# Patient Record
Sex: MALE | Race: WHITE | URBAN - METROPOLITAN AREA
[De-identification: names, ages, dates, MRNs, and addresses within clinical notes are randomized per-mention and may not be internally consistent; named-entity substitution may affect disease eponyms.]

---

## 2022-11-04 ENCOUNTER — OFFICE VISIT (OUTPATIENT)
Dept: CARDIOLOGY CLINIC | Age: 52
End: 2022-11-04
Payer: COMMERCIAL

## 2022-11-04 VITALS
DIASTOLIC BLOOD PRESSURE: 80 MMHG | RESPIRATION RATE: 16 BRPM | HEART RATE: 76 BPM | HEIGHT: 72 IN | WEIGHT: 315 LBS | BODY MASS INDEX: 42.66 KG/M2 | OXYGEN SATURATION: 95 % | SYSTOLIC BLOOD PRESSURE: 122 MMHG

## 2022-11-04 DIAGNOSIS — Z76.89 ESTABLISHING CARE WITH NEW DOCTOR, ENCOUNTER FOR: Primary | ICD-10-CM

## 2022-11-04 DIAGNOSIS — I10 HYPERTENSION, UNSPECIFIED TYPE: ICD-10-CM

## 2022-11-04 PROCEDURE — 93000 ELECTROCARDIOGRAM COMPLETE: CPT | Performed by: INTERNAL MEDICINE

## 2022-11-04 PROCEDURE — 3074F SYST BP LT 130 MM HG: CPT | Performed by: INTERNAL MEDICINE

## 2022-11-04 PROCEDURE — 99204 OFFICE O/P NEW MOD 45 MIN: CPT | Performed by: INTERNAL MEDICINE

## 2022-11-04 PROCEDURE — 3079F DIAST BP 80-89 MM HG: CPT | Performed by: INTERNAL MEDICINE

## 2022-11-04 RX ORDER — VALSARTAN AND HYDROCHLOROTHIAZIDE 320; 12.5 MG/1; MG/1
1 TABLET, FILM COATED ORAL DAILY
COMMUNITY
Start: 2022-09-06

## 2022-11-04 RX ORDER — AMLODIPINE BESYLATE 5 MG/1
5 TABLET ORAL DAILY
COMMUNITY

## 2022-11-04 RX ORDER — ESCITALOPRAM OXALATE 20 MG/1
TABLET ORAL
COMMUNITY
Start: 2021-11-29

## 2022-11-04 NOTE — PROGRESS NOTES
Yenny Soria MD, MS, [facility] [place]            HISTORY OF PRESENT ILLNESS:    Alanna Langley is a 50 y.o. male here to establish care. Followed by my prior practice. PMH HTN. Stress echo led to cardiac catherization that showed mild 30% stenosis. Records requested from prior practice - have not yet received. BP well controlled today. Walks at work,  @ [id number]. No CP, SOB. No edema. EKG reviewed, SR.       ++ DENA - wears. SUMMARY:   Problem List  Date Reviewed: 11/4/2022            Codes Class Noted    Establishing care with new doctor, encounter for ICD-10-CM: Z76.89  ICD-9-CM: V65.8  11/4/2022        Hypertension ICD-10-CM: I10  ICD-9-CM: 401.9  11/4/2022           Current Outpatient Medications on File Prior to Visit   Medication Sig    valsartan-hydroCHLOROthiazide (DIOVAN-HCT) 320-12.5 mg per tablet Take 1 Tablet by mouth daily. escitalopram oxalate (LEXAPRO) 20 mg tablet     amLODIPine (NORVASC) 5 mg tablet Take 5 mg by mouth daily. No current facility-administered medications on file prior to visit. CARDIOLOGY STUDIES TO DATE:  No results found for this visit on 11/04/22. CARDIAC ROS:   negative    History reviewed. No pertinent past medical history. History reviewed. No pertinent family history.     Social History     Socioeconomic History    Marital status: OTHER     Spouse name: Not on file    Number of children: Not on file    Years of education: Not on file    Highest education level: Not on file   Occupational History    Not on file   Tobacco Use    Smoking status: Former     Types: Cigarettes    Smokeless tobacco: Never   Substance and Sexual Activity    Alcohol use: Not on file    Drug use: Not on file    Sexual activity: Not on file   Other Topics Concern    Not on file   Social History Narrative    Not on file     Social Determinants of Health     Financial Resource Strain: Not on file   Food Insecurity: Not on file Transportation Needs: Not on file   Physical Activity: Not on file   Stress: Not on file   Social Connections: Not on file   Intimate Partner Violence: Not on file   Housing Stability: Not on file        GENERAL ROS:  A comprehensive review of systems was negative except for that written in the HPI. Visit Vitals  /80   Pulse 76   Resp 16   Ht 6' (1.829 m)   Wt 156.5 kg (345 lb)   SpO2 95%   BMI 46.79 kg/m²     Vitals:    11/04/22 1401   BP: 122/80   Pulse: 76   Resp: 16   SpO2: 95%   Weight: 156.5 kg (345 lb)   Height: 6' (1.829 m)        Wt Readings from Last 3 Encounters:   11/04/22 156.5 kg (345 lb)            BP Readings from Last 3 Encounters:   11/04/22 122/80       PHYSICAL EXAM  General appearance: alert, cooperative, no distress, appears stated age  Neck: supple, symmetrical, trachea midline, no adenopathy, thyroid: not enlarged, symmetric, no tenderness/mass/nodules, no carotid bruit, and no JVD  Lungs: clear to auscultation bilaterally  Heart: regular rate and rhythm, S1, S2 normal, no murmur, click, rub or gallop  Extremities: extremities normal, atraumatic, no cyanosis or edema    No results found for: CHOL, CHOLX, CHLST, CHOLV, 178043, HDL, HDLP, LDL, LDLC, DLDLP, TGLX, TRIGL, TRIGP, CHHD, CHHDX    No results found for: WBC, WBCLT, HGBPOC, HGB, HGBP, HCTPOC, HCT, PHCT, RBCH, PLT, MCV, HGBEXT, HCTEXT, PLTEXT, HGBEXT, HCTEXT, PLTEXT     No results found for: CHOL, CHOLPOCT, CHOLX, CHLST, CHOLV, HDL, HDLP, LDL, LDLC, DLDLP, TGLX, TRIGL, TRIGP, TGLPOCT, NTGLT, CHHD, CHHDX     ASSESSMENT    ICD-10-CM ICD-9-CM    1. Establishing care with new doctor, encounter for  Z76.89 V65.8 AMB POC EKG ROUTINE W/ 12 LEADS, INTER & REP      2. Hypertension, unspecified type  I10 401.9           Encounter Diagnoses   Name Primary?     Establishing care with new doctor, encounter for Yes    Hypertension, unspecified type      Orders Placed This Encounter    AMB POC EKG ROUTINE W/ 12 LEADS, INTER & REP valsartan-hydroCHLOROthiazide (DIOVAN-HCT) 320-12.5 mg per tablet    escitalopram oxalate (LEXAPRO) 20 mg tablet    amLODIPine (NORVASC) 5 mg tablet       Plan      Follow-up and Dispositions    Return in about 1 year (around 11/4/2023). Lizeth Blancas MD  11/4/2022        [address]  [address]  [phone] (F)    [address] [place] [address] [place]  [phone] (P)  [phone] (F)    ATTENTION:   This medical record was transcribed using an electronic medical records/speech recognition system. Although proofread, it may and can contain electronic, spelling and other errors. Corrections may be executed at a later time. Please feel free to contact us for any clarifications as needed.

## 2024-01-05 ENCOUNTER — OFFICE VISIT (OUTPATIENT)
Age: 54
End: 2024-01-05

## 2024-01-05 VITALS
RESPIRATION RATE: 16 BRPM | DIASTOLIC BLOOD PRESSURE: 80 MMHG | OXYGEN SATURATION: 95 % | HEART RATE: 80 BPM | HEIGHT: 72 IN | WEIGHT: 315 LBS | BODY MASS INDEX: 42.66 KG/M2 | SYSTOLIC BLOOD PRESSURE: 120 MMHG

## 2024-01-05 DIAGNOSIS — G47.33 OSA (OBSTRUCTIVE SLEEP APNEA): ICD-10-CM

## 2024-01-05 DIAGNOSIS — I10 HYPERTENSION, UNSPECIFIED TYPE: Primary | ICD-10-CM

## 2024-01-05 DIAGNOSIS — E66.01 CLASS 3 SEVERE OBESITY IN ADULT, UNSPECIFIED BMI, UNSPECIFIED OBESITY TYPE, UNSPECIFIED WHETHER SERIOUS COMORBIDITY PRESENT (HCC): ICD-10-CM

## 2024-01-05 PROBLEM — E66.813 CLASS 3 SEVERE OBESITY IN ADULT: Status: ACTIVE | Noted: 2024-01-05

## 2024-01-05 RX ORDER — BUPROPION HYDROCHLORIDE 150 MG/1
TABLET ORAL
COMMUNITY
Start: 2023-10-09

## 2024-01-05 ASSESSMENT — PATIENT HEALTH QUESTIONNAIRE - PHQ9
1. LITTLE INTEREST OR PLEASURE IN DOING THINGS: 0
2. FEELING DOWN, DEPRESSED OR HOPELESS: 0
SUM OF ALL RESPONSES TO PHQ QUESTIONS 1-9: 0
SUM OF ALL RESPONSES TO PHQ QUESTIONS 1-9: 0
SUM OF ALL RESPONSES TO PHQ9 QUESTIONS 1 & 2: 0
SUM OF ALL RESPONSES TO PHQ QUESTIONS 1-9: 0
SUM OF ALL RESPONSES TO PHQ QUESTIONS 1-9: 0

## 2024-01-05 NOTE — PROGRESS NOTES
Patient: Clyed Garcia  : 1970    Primary Cardiologist: Lexi Streeter MD    EP Cardiologist:  PCP: Herrera Jackson MD    Today's Date: 2024      ASSESSMENT AND PLAN:     Assessment and Plan:  HTN  Olmesartan HCT  Amlodipine  Well controlled    2. NENA  On CPAP    3. Obesity  Lifestyle changes, weight loss      Follow up one year.        ICD-10-CM    1. Hypertension, unspecified type  I10 EKG 12 Lead     EKG 12 Lead      2. NENA (obstructive sleep apnea)  G47.33       3. Class 3 severe obesity in adult, unspecified BMI, unspecified obesity type, unspecified whether serious comorbidity present ([facility])  E66.01           HISTORY OF PRESENT ILLNESS:     History of Present Illness:  Clyde Garcia is a 53 y.o. male    Clyde Garcia is a 52 y.o. male here to establish care.      Followed by my prior practice.      PMH HTN.      Stress echo led to cardiac catherization that showed mild 30% stenosis.  Records requested from prior practice - have not yet received.      BP well controlled today.      Walks at work,  @ [email or web address].  No CP, SOB.      No edema.        EKG reviewed, SR.         ++ NENA - wears.        Denies chest pain, edema, syncope, shortness of breath at rest, dyspnea on exertion, PND or orthopnea.  Has no tachycardia, palpitations or sense of arrhythmia.     PAST MEDICAL HISTORY:     History reviewed. No pertinent past medical history.    History reviewed. No pertinent surgical history.    CURRENT MEDICATIONS:    .  Current Outpatient Medications   Medication Sig Dispense Refill    buPROPion (WELLBUTRIN XL) 150 MG extended release tablet TAKE 1 TABLET BY MOUTH EVERY DAY IN THE MORNING FOR 90 DAYS      amLODIPine (NORVASC) 5 MG tablet Take 1 tablet by mouth daily      escitalopram (LEXAPRO) 20 MG tablet Take 1 tablet by mouth daily ceived the following from [facility]: Outside name: escitalopram oxalate (LEXAPRO) 20 mg tablet

## 2025-01-09 ENCOUNTER — OFFICE VISIT (OUTPATIENT)
Age: 55
End: 2025-01-09
Payer: COMMERCIAL

## 2025-01-09 VITALS
BODY MASS INDEX: 42.66 KG/M2 | WEIGHT: 315 LBS | HEIGHT: 72 IN | HEART RATE: 80 BPM | DIASTOLIC BLOOD PRESSURE: 80 MMHG | OXYGEN SATURATION: 99 % | SYSTOLIC BLOOD PRESSURE: 124 MMHG

## 2025-01-09 DIAGNOSIS — I10 HYPERTENSION, UNSPECIFIED TYPE: Primary | ICD-10-CM

## 2025-01-09 PROCEDURE — 3074F SYST BP LT 130 MM HG: CPT | Performed by: INTERNAL MEDICINE

## 2025-01-09 PROCEDURE — 93000 ELECTROCARDIOGRAM COMPLETE: CPT | Performed by: INTERNAL MEDICINE

## 2025-01-09 PROCEDURE — 99214 OFFICE O/P EST MOD 30 MIN: CPT | Performed by: INTERNAL MEDICINE

## 2025-01-09 PROCEDURE — 3079F DIAST BP 80-89 MM HG: CPT | Performed by: INTERNAL MEDICINE

## 2025-01-09 RX ORDER — FLUOXETINE HYDROCHLORIDE 60 MG/1
1 TABLET, FILM COATED ORAL; ORAL
COMMUNITY
Start: 2024-12-17 | End: 2025-03-17

## 2025-01-09 ASSESSMENT — PATIENT HEALTH QUESTIONNAIRE - PHQ9
SUM OF ALL RESPONSES TO PHQ QUESTIONS 1-9: 0
1. LITTLE INTEREST OR PLEASURE IN DOING THINGS: NOT AT ALL
SUM OF ALL RESPONSES TO PHQ QUESTIONS 1-9: 0
SUM OF ALL RESPONSES TO PHQ QUESTIONS 1-9: 0
SUM OF ALL RESPONSES TO PHQ9 QUESTIONS 1 & 2: 0
2. FEELING DOWN, DEPRESSED OR HOPELESS: NOT AT ALL
SUM OF ALL RESPONSES TO PHQ QUESTIONS 1-9: 0

## 2025-01-09 NOTE — PROGRESS NOTES
1. Have you been to the ER, urgent care clinic since your last visit?  Hospitalized since your last visit?No    2. Have you seen or consulted any other health care providers outside of the [facility] since your last visit?  Include any pap smears or colon screening. No

## 2025-01-09 NOTE — PROGRESS NOTES
Patient: Clyde Garcia  : 1970    Primary Cardiologist: Lexi Streeter MD    EP Cardiologist:  PCP: Herrera Jackson MD    Today's Date: 2025      ASSESSMENT AND PLAN:     Assessment and Plan:  HTN  Olmesartan HCT  Amlodipine  Well controlled    2. NENA  On CPAP    3. Obesity  Lifestyle changes, weight loss    4.  Lightheaded  Positional changes trigger  Riding elevator, soft ground  Maybe started with a fishing trip, equilibrium off.    5.  Left calf pain - nor bruising        Follow up one year.        ICD-10-CM    1. Hypertension, unspecified type  I10 EKG 12 Lead          HISTORY OF PRESENT ILLNESS:     History of Present Illness:  Clyde Garcia is a 54 y.o. male here for FU.      Followed by my prior practice.      PMH HTN.      Stress echo led to cardiac catherization that showed mild 30% stenosis.  Records requested from prior practice - have not yet received.      BP well controlled today.      Walks at work,  @ [employer].  No CP, SOB.      No edema.        EKG reviewed, SR.         ++ NENA - wears.      Denies chest pain, edema, syncope, shortness of breath at rest, dyspnea on exertion, PND or orthopnea.  Has no tachycardia, palpitations or sense of arrhythmia.     PAST MEDICAL HISTORY:     No past medical history on file.    No past surgical history on file.    CURRENT MEDICATIONS:    .  Current Outpatient Medications   Medication Sig Dispense Refill    FLUoxetine HCl 60 MG TABS Take 1 tablet by mouth      buPROPion (WELLBUTRIN XL) 150 MG extended release tablet TAKE 1 TABLET BY MOUTH EVERY DAY IN THE MORNING FOR 90 DAYS      amLODIPine (NORVASC) 5 MG tablet Take 1 tablet by mouth daily      valsartan-hydroCHLOROthiazide (DIOVAN-HCT) 320-12.5 MG per tablet Take 1 tablet by mouth daily       No current facility-administered medications for this visit.       Allergies   Allergen Reactions    Latex Hives and Swelling     POWDER LATEX       SOCIAL